# Patient Record
Sex: FEMALE | Race: OTHER | HISPANIC OR LATINO
[De-identification: names, ages, dates, MRNs, and addresses within clinical notes are randomized per-mention and may not be internally consistent; named-entity substitution may affect disease eponyms.]

---

## 2017-10-11 ENCOUNTER — RESULT REVIEW (OUTPATIENT)
Age: 37
End: 2017-10-11

## 2017-10-19 ENCOUNTER — APPOINTMENT (OUTPATIENT)
Dept: MAMMOGRAPHY | Facility: CLINIC | Age: 37
End: 2017-10-19

## 2017-10-26 ENCOUNTER — APPOINTMENT (OUTPATIENT)
Dept: ULTRASOUND IMAGING | Facility: CLINIC | Age: 37
End: 2017-10-26
Payer: COMMERCIAL

## 2017-10-26 ENCOUNTER — APPOINTMENT (OUTPATIENT)
Dept: MAMMOGRAPHY | Facility: CLINIC | Age: 37
End: 2017-10-26
Payer: COMMERCIAL

## 2017-10-26 ENCOUNTER — OUTPATIENT (OUTPATIENT)
Dept: OUTPATIENT SERVICES | Facility: HOSPITAL | Age: 37
LOS: 1 days | End: 2017-10-26
Payer: COMMERCIAL

## 2017-10-26 DIAGNOSIS — Z00.8 ENCOUNTER FOR OTHER GENERAL EXAMINATION: ICD-10-CM

## 2017-10-26 PROCEDURE — G0279: CPT | Mod: 26

## 2017-10-26 PROCEDURE — 76642 ULTRASOUND BREAST LIMITED: CPT

## 2017-10-26 PROCEDURE — G0204: CPT | Mod: 26

## 2017-10-26 PROCEDURE — 76642 ULTRASOUND BREAST LIMITED: CPT | Mod: 26,LT

## 2017-10-26 PROCEDURE — 77066 DX MAMMO INCL CAD BI: CPT

## 2017-10-26 PROCEDURE — G0279: CPT

## 2018-10-12 ENCOUNTER — RESULT REVIEW (OUTPATIENT)
Age: 38
End: 2018-10-12

## 2018-10-30 ENCOUNTER — OUTPATIENT (OUTPATIENT)
Dept: OUTPATIENT SERVICES | Facility: HOSPITAL | Age: 38
LOS: 1 days | End: 2018-10-30
Payer: COMMERCIAL

## 2018-10-30 ENCOUNTER — APPOINTMENT (OUTPATIENT)
Dept: MAMMOGRAPHY | Facility: CLINIC | Age: 38
End: 2018-10-30
Payer: COMMERCIAL

## 2018-10-30 DIAGNOSIS — Z00.8 ENCOUNTER FOR OTHER GENERAL EXAMINATION: ICD-10-CM

## 2018-10-30 PROCEDURE — 77067 SCR MAMMO BI INCL CAD: CPT

## 2018-10-30 PROCEDURE — 77063 BREAST TOMOSYNTHESIS BI: CPT

## 2018-10-30 PROCEDURE — 77067 SCR MAMMO BI INCL CAD: CPT | Mod: 26

## 2018-10-30 PROCEDURE — 77063 BREAST TOMOSYNTHESIS BI: CPT | Mod: 26

## 2018-12-23 ENCOUNTER — EMERGENCY (EMERGENCY)
Facility: HOSPITAL | Age: 38
LOS: 0 days | Discharge: HOME | End: 2018-12-23
Attending: EMERGENCY MEDICINE | Admitting: EMERGENCY MEDICINE
Payer: MEDICAID

## 2018-12-23 ENCOUNTER — TRANSCRIPTION ENCOUNTER (OUTPATIENT)
Age: 38
End: 2018-12-23

## 2018-12-23 VITALS
HEART RATE: 71 BPM | TEMPERATURE: 98 F | RESPIRATION RATE: 16 BRPM | SYSTOLIC BLOOD PRESSURE: 126 MMHG | DIASTOLIC BLOOD PRESSURE: 89 MMHG | OXYGEN SATURATION: 100 %

## 2018-12-23 VITALS — HEIGHT: 61 IN | WEIGHT: 158.07 LBS

## 2018-12-23 DIAGNOSIS — R07.9 CHEST PAIN, UNSPECIFIED: ICD-10-CM

## 2018-12-23 DIAGNOSIS — M79.602 PAIN IN LEFT ARM: ICD-10-CM

## 2018-12-23 DIAGNOSIS — M62.838 OTHER MUSCLE SPASM: ICD-10-CM

## 2018-12-23 DIAGNOSIS — R11.0 NAUSEA: ICD-10-CM

## 2018-12-23 DIAGNOSIS — Z87.59 PERSONAL HISTORY OF OTHER COMPLICATIONS OF PREGNANCY, CHILDBIRTH AND THE PUERPERIUM: ICD-10-CM

## 2018-12-23 LAB
ALBUMIN SERPL ELPH-MCNC: 4 G/DL — SIGNIFICANT CHANGE UP (ref 3.3–5)
ALP SERPL-CCNC: 79 U/L — SIGNIFICANT CHANGE UP (ref 40–120)
ALT FLD-CCNC: 29 U/L — SIGNIFICANT CHANGE UP (ref 12–78)
ANION GAP SERPL CALC-SCNC: 5 MMOL/L — SIGNIFICANT CHANGE UP (ref 5–17)
APTT BLD: 32.2 SEC — SIGNIFICANT CHANGE UP (ref 27.5–36.3)
AST SERPL-CCNC: 16 U/L — SIGNIFICANT CHANGE UP (ref 15–37)
BASOPHILS # BLD AUTO: 0.04 K/UL — SIGNIFICANT CHANGE UP (ref 0–0.2)
BASOPHILS NFR BLD AUTO: 0.6 % — SIGNIFICANT CHANGE UP (ref 0–2)
BILIRUB SERPL-MCNC: 0.4 MG/DL — SIGNIFICANT CHANGE UP (ref 0.2–1.2)
BUN SERPL-MCNC: 9 MG/DL — SIGNIFICANT CHANGE UP (ref 7–23)
CALCIUM SERPL-MCNC: 8.8 MG/DL — SIGNIFICANT CHANGE UP (ref 8.5–10.1)
CHLORIDE SERPL-SCNC: 107 MMOL/L — SIGNIFICANT CHANGE UP (ref 96–108)
CO2 SERPL-SCNC: 28 MMOL/L — SIGNIFICANT CHANGE UP (ref 22–31)
CREAT SERPL-MCNC: 0.72 MG/DL — SIGNIFICANT CHANGE UP (ref 0.5–1.3)
D DIMER BLD IA.RAPID-MCNC: <150 NG/ML DDU — SIGNIFICANT CHANGE UP
EOSINOPHIL # BLD AUTO: 0.2 K/UL — SIGNIFICANT CHANGE UP (ref 0–0.5)
EOSINOPHIL NFR BLD AUTO: 2.9 % — SIGNIFICANT CHANGE UP (ref 0–6)
GLUCOSE SERPL-MCNC: 88 MG/DL — SIGNIFICANT CHANGE UP (ref 70–99)
HCG SERPL-ACNC: <1 MIU/ML — SIGNIFICANT CHANGE UP
HCT VFR BLD CALC: 40.2 % — SIGNIFICANT CHANGE UP (ref 34.5–45)
HGB BLD-MCNC: 13.6 G/DL — SIGNIFICANT CHANGE UP (ref 11.5–15.5)
IMM GRANULOCYTES NFR BLD AUTO: 0.4 % — SIGNIFICANT CHANGE UP (ref 0–1.5)
INR BLD: 1.03 RATIO — SIGNIFICANT CHANGE UP (ref 0.88–1.16)
LYMPHOCYTES # BLD AUTO: 2.06 K/UL — SIGNIFICANT CHANGE UP (ref 1–3.3)
LYMPHOCYTES # BLD AUTO: 29.9 % — SIGNIFICANT CHANGE UP (ref 13–44)
MCHC RBC-ENTMCNC: 32.2 PG — SIGNIFICANT CHANGE UP (ref 27–34)
MCHC RBC-ENTMCNC: 33.8 GM/DL — SIGNIFICANT CHANGE UP (ref 32–36)
MCV RBC AUTO: 95 FL — SIGNIFICANT CHANGE UP (ref 80–100)
MONOCYTES # BLD AUTO: 0.52 K/UL — SIGNIFICANT CHANGE UP (ref 0–0.9)
MONOCYTES NFR BLD AUTO: 7.5 % — SIGNIFICANT CHANGE UP (ref 2–14)
NEUTROPHILS # BLD AUTO: 4.04 K/UL — SIGNIFICANT CHANGE UP (ref 1.8–7.4)
NEUTROPHILS NFR BLD AUTO: 58.7 % — SIGNIFICANT CHANGE UP (ref 43–77)
NRBC # BLD: 0 /100 WBCS — SIGNIFICANT CHANGE UP (ref 0–0)
PLATELET # BLD AUTO: 322 K/UL — SIGNIFICANT CHANGE UP (ref 150–400)
POTASSIUM SERPL-MCNC: 3.5 MMOL/L — SIGNIFICANT CHANGE UP (ref 3.5–5.3)
POTASSIUM SERPL-SCNC: 3.5 MMOL/L — SIGNIFICANT CHANGE UP (ref 3.5–5.3)
PROT SERPL-MCNC: 8 GM/DL — SIGNIFICANT CHANGE UP (ref 6–8.3)
PROTHROM AB SERPL-ACNC: 11.5 SEC — SIGNIFICANT CHANGE UP (ref 10–12.9)
RBC # BLD: 4.23 M/UL — SIGNIFICANT CHANGE UP (ref 3.8–5.2)
RBC # FLD: 12.7 % — SIGNIFICANT CHANGE UP (ref 10.3–14.5)
SODIUM SERPL-SCNC: 140 MMOL/L — SIGNIFICANT CHANGE UP (ref 135–145)
TROPONIN I SERPL-MCNC: <0.015 NG/ML — SIGNIFICANT CHANGE UP (ref 0.01–0.04)
TROPONIN I SERPL-MCNC: <0.015 NG/ML — SIGNIFICANT CHANGE UP (ref 0.01–0.04)
WBC # BLD: 6.89 K/UL — SIGNIFICANT CHANGE UP (ref 3.8–10.5)
WBC # FLD AUTO: 6.89 K/UL — SIGNIFICANT CHANGE UP (ref 3.8–10.5)

## 2018-12-23 PROCEDURE — 99283 EMERGENCY DEPT VISIT LOW MDM: CPT

## 2018-12-23 PROCEDURE — 71046 X-RAY EXAM CHEST 2 VIEWS: CPT | Mod: 26

## 2018-12-23 PROCEDURE — 93010 ELECTROCARDIOGRAM REPORT: CPT

## 2018-12-23 RX ORDER — IBUPROFEN 200 MG
600 TABLET ORAL ONCE
Qty: 0 | Refills: 0 | Status: COMPLETED | OUTPATIENT
Start: 2018-12-23 | End: 2018-12-23

## 2018-12-23 RX ORDER — ASPIRIN/CALCIUM CARB/MAGNESIUM 324 MG
325 TABLET ORAL ONCE
Qty: 0 | Refills: 0 | Status: COMPLETED | OUTPATIENT
Start: 2018-12-23 | End: 2018-12-23

## 2018-12-23 RX ORDER — METHOCARBAMOL 500 MG/1
1 TABLET, FILM COATED ORAL
Qty: 15 | Refills: 0 | OUTPATIENT
Start: 2018-12-23 | End: 2018-12-27

## 2018-12-23 RX ORDER — ACETAMINOPHEN 500 MG
975 TABLET ORAL ONCE
Qty: 0 | Refills: 0 | Status: COMPLETED | OUTPATIENT
Start: 2018-12-23 | End: 2018-12-23

## 2018-12-23 RX ORDER — IBUPROFEN 200 MG
1 TABLET ORAL
Qty: 10 | Refills: 0 | OUTPATIENT
Start: 2018-12-23 | End: 2018-12-27

## 2018-12-23 RX ADMIN — Medication 975 MILLIGRAM(S): at 17:29

## 2018-12-23 RX ADMIN — Medication 325 MILLIGRAM(S): at 16:57

## 2018-12-23 RX ADMIN — Medication 975 MILLIGRAM(S): at 16:56

## 2018-12-23 RX ADMIN — Medication 600 MILLIGRAM(S): at 21:50

## 2018-12-23 NOTE — ED STATDOCS - NS_ ATTENDINGSCRIBEDETAILS _ED_A_ED_FT
The scribe's documentation has been prepared under my direction and personally reviewed by me in its entirety.  I confirm that the note above accurately reflects all my work, treatment, procedures, and decision making except where otherwise noted or amended by me.  Aramis Woods M.D.

## 2018-12-23 NOTE — ED STATDOCS - ATTENDING CONTRIBUTION TO CARE
SHELLI Woods MD,  performed the initial face to face bedside interview with this patient regarding history of present illness, review of symptoms and relevant past medical, social and family history.  I completed an independent physical examination.  I was the initial provider who evaluated this patient. I have signed out the follow up of any pending tests (i.e. labs, radiological studies) to the ACP.  I have communicated the patient’s plan of care and disposition with the ACP.

## 2018-12-23 NOTE — ED ADULT TRIAGE NOTE - CHIEF COMPLAINT QUOTE
pt presents to ED from  c/o chest pain since this morning. pt went to  to be evaluated and was instructed to f/u @ ED. + back pain.

## 2018-12-23 NOTE — ED STATDOCS - MEDICAL DECISION MAKING DETAILS
37 y/o F with PMHx of Ectopic Pregnancy presenting to the ED c/o CP radiating into LUE since this morning. Plan: r/o PE, r/o ACS. Likely MSK. Likely d/c with outpatient follow up. 39 y/o F with PMHx of Ectopic Pregnancy, Breast Biopsy presenting to the ED c/o CP radiating into LUE since this morning. Plan: r/o PE, r/o ACS. Likely MSK. Likely d/c with outpatient follow up. 37 y/o F with PMHx of Ectopic Pregnancy, Breast Biopsy presenting to the ED c/o CP radiating into LUE since this morning. Plan: r/o PE, r/o ACS. Likely MSK. EKG serial trop.  Likely d/c with outpatient follow up.

## 2018-12-23 NOTE — ED STATDOCS - NSFOLLOWUPINSTRUCTIONS_ED_ALL_ED_FT
follow up with pmd and orthopedic  take NSAIDs as prescribed   return to ed for any worsening of symptoms.

## 2018-12-23 NOTE — ED STATDOCS - PROGRESS NOTE DETAILS
37 y/o F with PMHx of Ectopic Pregnancy, Breast Biopsy presenting to the ED c/o CP radiating into LUE since this morning. Pt speaks Yakut and HPI obtained via [facility] , #[id number]. CP exacerbated by moving upper extremities, not exacerbated with ambulation. +sore throat. +nausea. Seen at Urgent Care, advised to come into ED for evaluation. Denies any SOB, abd pain, vomiting, LE pain, LE swelling, diarrhea. fevers, or chills. No recent travel, trauma, or surgeries. No hx of blood clots. No significant cardiac FHx. No alcohol, tobacco, or recreational drug use. NKDA. PE Cardiac: s1-s2 rrr, tenderness to left anterior chest wall , no crepitus/ecchymosis/bruising. plan: labsc, cxr, pain meds and reeval. -Celine Ramírez PA-C pt aware of lab results and feels better with meds given advised to fu with pmd and ortho and to return to ed if needed, pt agrees with plan and well appearing on dc. -Celine Ramírez PA-C EKG reveals no evidence of ACS.  Trop x2 negative.  No fever or EKG findings suggestive of pericarditis, or myocarditis.  No Allen's Triad or signs of pericardial tamponade.  No clinical findings suggestive of  pulmonary embolism. D Dimer negative. CXR normal with clear lungs, normal mediastinum, and bilateral breath sounds.  No findings suggestive of pneumothorax, pneumonia, or esophageal perforation.  Historically pain not abrupt in onset, not tearing or ripping, pulses are symmetric, no murmur noted, no clinical findings suggestive of aortic dissection.  D dimer negative.  Normal mediastinum.

## 2018-12-23 NOTE — ED STATDOCS - OBJECTIVE STATEMENT
39 y/o F with PMHx of Ectopic Pregnancy presenting to the ED c/o CP radiating into LUE since this morning. Pt speaks Citizen of [place] and HPI obtained via [employer] , #[id number]. CP exacerbated by moving upper extremities, not exacerbated with ambulation. +sore throat. +nausea. Seen at Urgent Care, advised to come into ED for evaluation. Denies any SOB, abd pain, vomiting, LE pain, LE swelling, diarrhea. fevers, or chills. No recent travel, trauma, or surgeries. No hx of blood clots. No significant cardiac FHx. No alcohol, tobacco, or recreational drug use. NKDA. 37 y/o F with PMHx of Ectopic Pregnancy, Breast Biopsy presenting to the ED c/o CP radiating into LUE since this morning. Pt speaks Persian and HPI obtained via [employer] , #[id number]. CP exacerbated by moving upper extremities, not exacerbated with ambulation. +sore throat. +nausea. Seen at Urgent Care, advised to come into ED for evaluation. Denies any SOB, abd pain, vomiting, LE pain, LE swelling, diarrhea. fevers, or chills. No recent travel, trauma, or surgeries. No hx of blood clots. No significant cardiac FHx. No alcohol, tobacco, or recreational drug use. NKDA.

## 2018-12-23 NOTE — ED ADULT NURSE REASSESSMENT NOTE - NS ED NURSE REASSESS COMMENT FT1
Patient updated to plan of care. Patient requesting PO fluids. Given per Celine ZHANG. No distress noted. Results given to patient. Pending discharge.

## 2018-12-23 NOTE — ED ADULT NURSE NOTE - OBJECTIVE STATEMENT
c/o chest pain since Thursday radiating to neck, left arm. Denies med hx. Denies fever, reports SOB due to pain.

## 2018-12-23 NOTE — ED STATDOCS - PHYSICAL EXAMINATION
Pulses equal in all four extremities. No carotid bruit.  No focal neurological deficit.  No unilateral pallor or vascular compromise.  No heart murmur.

## 2018-12-23 NOTE — ED STATDOCS - CARE PROVIDER_API CALL
Genaro Doshi (DO), Orthopaedic Surgery  [address] [place] [address]  Phone: [phone]  Fax: [phone]

## 2019-04-11 PROBLEM — O00.90 UNSPECIFIED ECTOPIC PREGNANCY WITHOUT INTRAUTERINE PREGNANCY: Chronic | Status: ACTIVE | Noted: 2019-01-01

## 2019-04-26 ENCOUNTER — OUTPATIENT (OUTPATIENT)
Dept: OUTPATIENT SERVICES | Facility: HOSPITAL | Age: 39
LOS: 1 days | End: 2019-04-26
Payer: COMMERCIAL

## 2019-04-26 ENCOUNTER — APPOINTMENT (OUTPATIENT)
Dept: ULTRASOUND IMAGING | Facility: CLINIC | Age: 39
End: 2019-04-26
Payer: COMMERCIAL

## 2019-04-26 DIAGNOSIS — Z00.8 ENCOUNTER FOR OTHER GENERAL EXAMINATION: ICD-10-CM

## 2019-04-26 PROCEDURE — 76641 ULTRASOUND BREAST COMPLETE: CPT | Mod: 26,RT

## 2019-04-26 PROCEDURE — 76641 ULTRASOUND BREAST COMPLETE: CPT

## 2019-10-25 ENCOUNTER — RESULT REVIEW (OUTPATIENT)
Age: 39
End: 2019-10-25

## 2019-10-29 ENCOUNTER — APPOINTMENT (OUTPATIENT)
Dept: MAMMOGRAPHY | Facility: CLINIC | Age: 39
End: 2019-10-29

## 2019-10-31 ENCOUNTER — APPOINTMENT (OUTPATIENT)
Dept: MAMMOGRAPHY | Facility: CLINIC | Age: 39
End: 2019-10-31
Payer: COMMERCIAL

## 2019-10-31 ENCOUNTER — OUTPATIENT (OUTPATIENT)
Dept: OUTPATIENT SERVICES | Facility: HOSPITAL | Age: 39
LOS: 1 days | End: 2019-10-31
Payer: COMMERCIAL

## 2019-10-31 DIAGNOSIS — Z00.8 ENCOUNTER FOR OTHER GENERAL EXAMINATION: ICD-10-CM

## 2019-10-31 PROCEDURE — 77063 BREAST TOMOSYNTHESIS BI: CPT

## 2019-10-31 PROCEDURE — 77067 SCR MAMMO BI INCL CAD: CPT | Mod: 26

## 2019-10-31 PROCEDURE — 77067 SCR MAMMO BI INCL CAD: CPT

## 2019-10-31 PROCEDURE — 77063 BREAST TOMOSYNTHESIS BI: CPT | Mod: 26

## 2019-11-25 NOTE — ED ADULT NURSE NOTE - PAIN RATING/NUMBER SCALE (0-10): ACTIVITY
Pt here for inhaler instruction; Wife and son are present  We began discussing inhaler technique and it was decided that pt will be better with an aerochamber; Instructions given to wife and pt;   Rx sent to pharmacy and she will call if there are any pro 3

## 2019-12-02 ENCOUNTER — RESULT REVIEW (OUTPATIENT)
Age: 39
End: 2019-12-02

## 2020-04-09 ENCOUNTER — EMERGENCY (EMERGENCY)
Facility: HOSPITAL | Age: 40
LOS: 0 days | Discharge: HOME | End: 2020-04-09
Payer: MEDICARE

## 2020-04-09 VITALS
RESPIRATION RATE: 18 BRPM | DIASTOLIC BLOOD PRESSURE: 77 MMHG | SYSTOLIC BLOOD PRESSURE: 125 MMHG | TEMPERATURE: 99 F | OXYGEN SATURATION: 100 % | HEART RATE: 73 BPM

## 2020-04-09 DIAGNOSIS — R50.9 FEVER, UNSPECIFIED: ICD-10-CM

## 2020-04-09 DIAGNOSIS — R19.7 DIARRHEA, UNSPECIFIED: ICD-10-CM

## 2020-04-09 DIAGNOSIS — Z20.828 CONTACT WITH AND (SUSPECTED) EXPOSURE TO OTHER VIRAL COMMUNICABLE DISEASES: ICD-10-CM

## 2020-04-09 DIAGNOSIS — R05 COUGH: ICD-10-CM

## 2020-04-09 DIAGNOSIS — B34.9 VIRAL INFECTION, UNSPECIFIED: ICD-10-CM

## 2020-04-09 PROCEDURE — 99283 EMERGENCY DEPT VISIT LOW MDM: CPT

## 2020-04-09 PROCEDURE — 87635 SARS-COV-2 COVID-19 AMP PRB: CPT

## 2020-04-09 NOTE — ED STATDOCS - OBJECTIVE STATEMENT
Pt with no PMH presents to ED with subjective fever, cough, diarrhea intermittently, body aches, sore throat x 14 days. Pt recently exposed to COVID-19. Pt here for testing.

## 2020-04-09 NOTE — ED STATDOCS - PATIENT PORTAL LINK FT
You can access the FollowMyHealth Patient Portal offered by [facility] by registering at the following website: [email or web address]. By joining [facility]’s FollowMyHealth portal, you will also be able to view your health information using other applications (apps) compatible with our system.

## 2020-04-09 NOTE — ED ADULT NURSE NOTE - OBJECTIVE STATEMENT
PATIENT WAS TREATED, EVALUATED AND DISCHARGED BY INTAKE PROVIDER. PLEASE SEE PROVIDER NOTE FOR ASSESSMENT.  DISCHARGE INSTRUCTIONS REVIEWED WITH PATIENT VERBALLY, PT VERBALIZED UNDERSTANDING OF DISCHARGE INSTRUCTIONS. PAPER COPY OF DISCHARGE INSTRUCTIONS GIVEN TO PATIENT WITH SELF SIDRA AND COVID 19 INFORMATION.  Patient has given verbal consent to call/text them with results.

## 2020-04-10 LAB — SARS-COV-2 RNA SPEC QL NAA+PROBE: SIGNIFICANT CHANGE UP

## 2020-07-14 ENCOUNTER — TRANSCRIPTION ENCOUNTER (OUTPATIENT)
Age: 40
End: 2020-07-14

## 2020-08-05 ENCOUNTER — RESULT REVIEW (OUTPATIENT)
Age: 40
End: 2020-08-05

## 2020-08-05 ENCOUNTER — APPOINTMENT (OUTPATIENT)
Dept: MRI IMAGING | Facility: CLINIC | Age: 40
End: 2020-08-05
Payer: COMMERCIAL

## 2020-08-05 ENCOUNTER — OUTPATIENT (OUTPATIENT)
Dept: OUTPATIENT SERVICES | Facility: HOSPITAL | Age: 40
LOS: 1 days | End: 2020-08-05
Payer: COMMERCIAL

## 2020-08-05 DIAGNOSIS — Z00.8 ENCOUNTER FOR OTHER GENERAL EXAMINATION: ICD-10-CM

## 2020-08-05 PROCEDURE — 72148 MRI LUMBAR SPINE W/O DYE: CPT

## 2020-08-05 PROCEDURE — 72148 MRI LUMBAR SPINE W/O DYE: CPT | Mod: 26

## 2020-09-09 ENCOUNTER — TRANSCRIPTION ENCOUNTER (OUTPATIENT)
Age: 40
End: 2020-09-09

## 2020-11-18 ENCOUNTER — RESULT REVIEW (OUTPATIENT)
Age: 40
End: 2020-11-18

## 2020-12-01 ENCOUNTER — APPOINTMENT (OUTPATIENT)
Dept: MAMMOGRAPHY | Facility: CLINIC | Age: 40
End: 2020-12-01
Payer: MEDICAID

## 2020-12-01 ENCOUNTER — RESULT REVIEW (OUTPATIENT)
Age: 40
End: 2020-12-01

## 2020-12-01 ENCOUNTER — OUTPATIENT (OUTPATIENT)
Dept: OUTPATIENT SERVICES | Facility: HOSPITAL | Age: 40
LOS: 1 days | End: 2020-12-01
Payer: MEDICAID

## 2020-12-01 DIAGNOSIS — Z00.8 ENCOUNTER FOR OTHER GENERAL EXAMINATION: ICD-10-CM

## 2020-12-01 PROCEDURE — 77063 BREAST TOMOSYNTHESIS BI: CPT | Mod: 26

## 2020-12-01 PROCEDURE — 77063 BREAST TOMOSYNTHESIS BI: CPT

## 2020-12-01 PROCEDURE — 77067 SCR MAMMO BI INCL CAD: CPT

## 2020-12-01 PROCEDURE — 77067 SCR MAMMO BI INCL CAD: CPT | Mod: 26

## 2020-12-13 ENCOUNTER — RESULT REVIEW (OUTPATIENT)
Age: 40
End: 2020-12-13

## 2020-12-13 ENCOUNTER — APPOINTMENT (OUTPATIENT)
Dept: ULTRASOUND IMAGING | Facility: CLINIC | Age: 40
End: 2020-12-13
Payer: MEDICAID

## 2020-12-13 ENCOUNTER — OUTPATIENT (OUTPATIENT)
Dept: OUTPATIENT SERVICES | Facility: HOSPITAL | Age: 40
LOS: 1 days | End: 2020-12-13
Payer: MEDICAID

## 2020-12-13 DIAGNOSIS — E04.1 NONTOXIC SINGLE THYROID NODULE: ICD-10-CM

## 2020-12-13 PROCEDURE — 76536 US EXAM OF HEAD AND NECK: CPT | Mod: 26

## 2020-12-13 PROCEDURE — 76536 US EXAM OF HEAD AND NECK: CPT

## 2021-01-16 ENCOUNTER — OUTPATIENT (OUTPATIENT)
Dept: OUTPATIENT SERVICES | Facility: HOSPITAL | Age: 41
LOS: 1 days | End: 2021-01-16
Payer: MEDICAID

## 2021-01-16 DIAGNOSIS — Z20.828 CONTACT WITH AND (SUSPECTED) EXPOSURE TO OTHER VIRAL COMMUNICABLE DISEASES: ICD-10-CM

## 2021-01-16 PROCEDURE — C9803: CPT

## 2021-01-16 PROCEDURE — U0005: CPT

## 2021-01-16 PROCEDURE — U0003: CPT

## 2021-01-17 DIAGNOSIS — Z20.828 CONTACT WITH AND (SUSPECTED) EXPOSURE TO OTHER VIRAL COMMUNICABLE DISEASES: ICD-10-CM

## 2021-01-17 LAB — SARS-COV-2 RNA SPEC QL NAA+PROBE: SIGNIFICANT CHANGE UP

## 2021-03-22 ENCOUNTER — TRANSCRIPTION ENCOUNTER (OUTPATIENT)
Age: 41
End: 2021-03-22

## 2021-04-25 ENCOUNTER — TRANSCRIPTION ENCOUNTER (OUTPATIENT)
Age: 41
End: 2021-04-25

## 2021-07-18 NOTE — ED STATDOCS - INTERPRETATION
(normal spontaneous vaginal delivery)   FT 6#7   FT 9#  Status post total shoulder arthroplasty, left  2015   normal sinus rhythm

## 2021-11-05 ENCOUNTER — RESULT REVIEW (OUTPATIENT)
Age: 41
End: 2021-11-05

## 2021-12-11 ENCOUNTER — APPOINTMENT (OUTPATIENT)
Dept: MAMMOGRAPHY | Facility: CLINIC | Age: 41
End: 2021-12-11
Payer: MEDICAID

## 2021-12-11 ENCOUNTER — OUTPATIENT (OUTPATIENT)
Dept: OUTPATIENT SERVICES | Facility: HOSPITAL | Age: 41
LOS: 1 days | End: 2021-12-11
Payer: MEDICAID

## 2021-12-11 DIAGNOSIS — Z00.8 ENCOUNTER FOR OTHER GENERAL EXAMINATION: ICD-10-CM

## 2021-12-11 PROCEDURE — 77063 BREAST TOMOSYNTHESIS BI: CPT

## 2021-12-11 PROCEDURE — 77063 BREAST TOMOSYNTHESIS BI: CPT | Mod: 26

## 2021-12-11 PROCEDURE — 77067 SCR MAMMO BI INCL CAD: CPT | Mod: 26

## 2021-12-11 PROCEDURE — 77067 SCR MAMMO BI INCL CAD: CPT

## 2023-02-18 ENCOUNTER — TRANSCRIPTION ENCOUNTER (OUTPATIENT)
Age: 43
End: 2023-02-18

## 2023-02-18 ENCOUNTER — APPOINTMENT (OUTPATIENT)
Dept: MAMMOGRAPHY | Facility: CLINIC | Age: 43
End: 2023-02-18
Payer: MEDICAID

## 2023-02-18 ENCOUNTER — OUTPATIENT (OUTPATIENT)
Dept: OUTPATIENT SERVICES | Facility: HOSPITAL | Age: 43
LOS: 1 days | End: 2023-02-18
Payer: MEDICAID

## 2023-02-18 DIAGNOSIS — Z01.419 ENCOUNTER FOR GYNECOLOGICAL EXAMINATION (GENERAL) (ROUTINE) WITHOUT ABNORMAL FINDINGS: ICD-10-CM

## 2023-02-18 DIAGNOSIS — Z00.8 ENCOUNTER FOR OTHER GENERAL EXAMINATION: ICD-10-CM

## 2023-02-18 PROCEDURE — 77067 SCR MAMMO BI INCL CAD: CPT | Mod: 26

## 2023-02-18 PROCEDURE — 77067 SCR MAMMO BI INCL CAD: CPT

## 2023-02-18 PROCEDURE — 77063 BREAST TOMOSYNTHESIS BI: CPT

## 2023-02-18 PROCEDURE — 77063 BREAST TOMOSYNTHESIS BI: CPT | Mod: 26

## 2023-05-11 ENCOUNTER — OUTPATIENT (OUTPATIENT)
Dept: OUTPATIENT SERVICES | Facility: HOSPITAL | Age: 43
LOS: 1 days | End: 2023-05-11
Payer: MEDICAID

## 2023-05-11 ENCOUNTER — APPOINTMENT (OUTPATIENT)
Dept: MRI IMAGING | Facility: CLINIC | Age: 43
End: 2023-05-11
Payer: MEDICAID

## 2023-05-11 DIAGNOSIS — Z00.8 ENCOUNTER FOR OTHER GENERAL EXAMINATION: ICD-10-CM

## 2023-05-11 DIAGNOSIS — R92.8 OTHER ABNORMAL AND INCONCLUSIVE FINDINGS ON DIAGNOSTIC IMAGING OF BREAST: ICD-10-CM

## 2023-05-11 PROCEDURE — 77049 MRI BREAST C-+ W/CAD BI: CPT | Mod: 26

## 2023-05-11 PROCEDURE — A9585: CPT

## 2023-05-11 PROCEDURE — C8937: CPT

## 2023-05-11 PROCEDURE — C8908: CPT

## 2023-08-03 ENCOUNTER — APPOINTMENT (OUTPATIENT)
Dept: DERMATOLOGY | Facility: CLINIC | Age: 43
End: 2023-08-03
Payer: MEDICAID

## 2023-08-03 VITALS — HEIGHT: 65 IN | WEIGHT: 145 LBS | BODY MASS INDEX: 24.16 KG/M2

## 2023-08-03 DIAGNOSIS — L82.1 OTHER SEBORRHEIC KERATOSIS: ICD-10-CM

## 2023-08-03 PROCEDURE — 99203 OFFICE O/P NEW LOW 30 MIN: CPT

## 2023-08-03 NOTE — HISTORY OF PRESENT ILLNESS
[FreeTextEntry1] : NPV spot [de-identified] : DEVI SETHI  is a pleasant 42 year old F, who presented for the following:  - Bump on the left forehead x 1 year, itchy and growing. No treatments tried - Also with another brown bump on the L NLF

## 2023-08-03 NOTE — ASSESSMENT
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 I have discussed the nature and usual chronic course with the patient. Reassured  RTC prn

## 2023-08-08 ENCOUNTER — EMERGENCY (EMERGENCY)
Facility: HOSPITAL | Age: 43
LOS: 0 days | Discharge: HOME | End: 2023-08-08
Attending: HOSPITALIST
Payer: MEDICAID

## 2023-08-08 VITALS
HEART RATE: 74 BPM | TEMPERATURE: 98 F | OXYGEN SATURATION: 98 % | RESPIRATION RATE: 17 BRPM | DIASTOLIC BLOOD PRESSURE: 72 MMHG | SYSTOLIC BLOOD PRESSURE: 113 MMHG

## 2023-08-08 VITALS — HEIGHT: 64 IN | WEIGHT: 145.06 LBS

## 2023-08-08 DIAGNOSIS — B97.89 OTHER VIRAL AGENTS AS THE CAUSE OF DISEASES CLASSIFIED ELSEWHERE: ICD-10-CM

## 2023-08-08 DIAGNOSIS — J06.9 ACUTE UPPER RESPIRATORY INFECTION, UNSPECIFIED: ICD-10-CM

## 2023-08-08 DIAGNOSIS — R42 DIZZINESS AND GIDDINESS: ICD-10-CM

## 2023-08-08 DIAGNOSIS — H11.31 CONJUNCTIVAL HEMORRHAGE, RIGHT EYE: ICD-10-CM

## 2023-08-08 DIAGNOSIS — R73.03 PREDIABETES: ICD-10-CM

## 2023-08-08 LAB
ALBUMIN SERPL ELPH-MCNC: 3.5 G/DL — SIGNIFICANT CHANGE UP (ref 3.3–5)
ALP SERPL-CCNC: 74 U/L — SIGNIFICANT CHANGE UP (ref 40–120)
ALT FLD-CCNC: 21 U/L — SIGNIFICANT CHANGE UP (ref 12–78)
ANION GAP SERPL CALC-SCNC: 4 MMOL/L — LOW (ref 5–17)
AST SERPL-CCNC: 18 U/L — SIGNIFICANT CHANGE UP (ref 15–37)
BASOPHILS # BLD AUTO: 0.04 K/UL — SIGNIFICANT CHANGE UP (ref 0–0.2)
BASOPHILS NFR BLD AUTO: 0.6 % — SIGNIFICANT CHANGE UP (ref 0–2)
BILIRUB SERPL-MCNC: 0.3 MG/DL — SIGNIFICANT CHANGE UP (ref 0.2–1.2)
BUN SERPL-MCNC: 14 MG/DL — SIGNIFICANT CHANGE UP (ref 7–23)
CALCIUM SERPL-MCNC: 8.7 MG/DL — SIGNIFICANT CHANGE UP (ref 8.5–10.1)
CHLORIDE SERPL-SCNC: 108 MMOL/L — SIGNIFICANT CHANGE UP (ref 96–108)
CO2 SERPL-SCNC: 29 MMOL/L — SIGNIFICANT CHANGE UP (ref 22–31)
CREAT SERPL-MCNC: 0.81 MG/DL — SIGNIFICANT CHANGE UP (ref 0.5–1.3)
EGFR: 91 ML/MIN/1.73M2 — SIGNIFICANT CHANGE UP
EOSINOPHIL # BLD AUTO: 0.21 K/UL — SIGNIFICANT CHANGE UP (ref 0–0.5)
EOSINOPHIL NFR BLD AUTO: 3.4 % — SIGNIFICANT CHANGE UP (ref 0–6)
GLUCOSE SERPL-MCNC: 98 MG/DL — SIGNIFICANT CHANGE UP (ref 70–99)
HCT VFR BLD CALC: 36.4 % — SIGNIFICANT CHANGE UP (ref 34.5–45)
HGB BLD-MCNC: 12.9 G/DL — SIGNIFICANT CHANGE UP (ref 11.5–15.5)
IMM GRANULOCYTES NFR BLD AUTO: 0.3 % — SIGNIFICANT CHANGE UP (ref 0–0.9)
LYMPHOCYTES # BLD AUTO: 2.21 K/UL — SIGNIFICANT CHANGE UP (ref 1–3.3)
LYMPHOCYTES # BLD AUTO: 35.6 % — SIGNIFICANT CHANGE UP (ref 13–44)
MCHC RBC-ENTMCNC: 34.2 PG — HIGH (ref 27–34)
MCHC RBC-ENTMCNC: 35.4 GM/DL — SIGNIFICANT CHANGE UP (ref 32–36)
MCV RBC AUTO: 96.6 FL — SIGNIFICANT CHANGE UP (ref 80–100)
MONOCYTES # BLD AUTO: 0.43 K/UL — SIGNIFICANT CHANGE UP (ref 0–0.9)
MONOCYTES NFR BLD AUTO: 6.9 % — SIGNIFICANT CHANGE UP (ref 2–14)
NEUTROPHILS # BLD AUTO: 3.3 K/UL — SIGNIFICANT CHANGE UP (ref 1.8–7.4)
NEUTROPHILS NFR BLD AUTO: 53.2 % — SIGNIFICANT CHANGE UP (ref 43–77)
PLATELET # BLD AUTO: 304 K/UL — SIGNIFICANT CHANGE UP (ref 150–400)
POTASSIUM SERPL-MCNC: 3.6 MMOL/L — SIGNIFICANT CHANGE UP (ref 3.5–5.3)
POTASSIUM SERPL-SCNC: 3.6 MMOL/L — SIGNIFICANT CHANGE UP (ref 3.5–5.3)
PROT SERPL-MCNC: 7.1 GM/DL — SIGNIFICANT CHANGE UP (ref 6–8.3)
RBC # BLD: 3.77 M/UL — LOW (ref 3.8–5.2)
RBC # FLD: 13.2 % — SIGNIFICANT CHANGE UP (ref 10.3–14.5)
SODIUM SERPL-SCNC: 141 MMOL/L — SIGNIFICANT CHANGE UP (ref 135–145)
TSH SERPL-MCNC: 2.25 UU/ML — SIGNIFICANT CHANGE UP (ref 0.34–4.82)
WBC # BLD: 6.21 K/UL — SIGNIFICANT CHANGE UP (ref 3.8–10.5)
WBC # FLD AUTO: 6.21 K/UL — SIGNIFICANT CHANGE UP (ref 3.8–10.5)

## 2023-08-08 PROCEDURE — 84443 ASSAY THYROID STIM HORMONE: CPT

## 2023-08-08 PROCEDURE — 99284 EMERGENCY DEPT VISIT MOD MDM: CPT

## 2023-08-08 PROCEDURE — 82962 GLUCOSE BLOOD TEST: CPT

## 2023-08-08 PROCEDURE — 93005 ELECTROCARDIOGRAM TRACING: CPT

## 2023-08-08 PROCEDURE — 93010 ELECTROCARDIOGRAM REPORT: CPT

## 2023-08-08 PROCEDURE — 36415 COLL VENOUS BLD VENIPUNCTURE: CPT

## 2023-08-08 PROCEDURE — 80053 COMPREHEN METABOLIC PANEL: CPT

## 2023-08-08 PROCEDURE — 99283 EMERGENCY DEPT VISIT LOW MDM: CPT

## 2023-08-08 PROCEDURE — 85025 COMPLETE CBC W/AUTO DIFF WBC: CPT

## 2023-08-08 RX ORDER — SODIUM CHLORIDE 9 MG/ML
1000 INJECTION INTRAMUSCULAR; INTRAVENOUS; SUBCUTANEOUS ONCE
Refills: 0 | Status: COMPLETED | OUTPATIENT
Start: 2023-08-08 | End: 2023-08-08

## 2023-08-08 RX ORDER — MECLIZINE HCL 12.5 MG
1 TABLET ORAL
Qty: 15 | Refills: 0
Start: 2023-08-08 | End: 2023-08-12

## 2023-08-08 RX ADMIN — SODIUM CHLORIDE 1000 MILLILITER(S): 9 INJECTION INTRAMUSCULAR; INTRAVENOUS; SUBCUTANEOUS at 20:48

## 2023-08-08 NOTE — ED ADULT TRIAGE NOTE - CHIEF COMPLAINT QUOTE
ambulatory to triage, complains of dizziness, right eye redness/discomfort, pain to right side of face since 11am today. denies facial numbness. no facial asymmetry.

## 2023-08-08 NOTE — ED STATDOCS - NS ED ATTENDING STATEMENT MOD
This was a shared visit with the LEFTY. I reviewed and verified the documentation and independently performed the documented:

## 2023-08-08 NOTE — ED STATDOCS - PHYSICAL EXAMINATION
Constitutional: NAD AAOx3  Eyes: +subconjunctival hemorrhage to medial aspect of her R eye  Head: Normocephalic atraumatic  Mouth: MMM  Cardiac: regular rate   Resp: Lungs CTAB  GI: Abd s/nt/nd  Neuro: CN2-12 intact  Skin: No visible rashes

## 2023-08-08 NOTE — ED STATDOCS - NS_ ATTENDINGSCRIBEDETAILS _ED_A_ED_FT
Kaya Moulton MD: The history, relevant review of systems, past medical and surgical history, medical decision making, and physical examination was documented by the scribe in my presence and I attest to the accuracy of the documentation.

## 2023-08-08 NOTE — ED STATDOCS - OBJECTIVE STATEMENT
44 y/o female w/PMHx of preDM and thyroid cyst that she is following up with PMD presents to ED c/o sensation of dizziness while working at approx. 1100am this morning. Later int he afternoon, patient noticed right eye appeared red. Denies any pain or visual changes. No similar symptoms in the past. Reports mild ear discomfort and throat pain when swallowing. No fevers or neck stiffness. Notes 15lbs weight loss over the past few months and increased hair loss.

## 2023-08-08 NOTE — ED STATDOCS - NSFOLLOWUPINSTRUCTIONS_ED_ALL_ED_FT
Mareos  Dizziness  Los mareos son un problema muy frecuente. Se trata de yoanna sensación de inestabilidad o desvanecimiento. Puede sentir que se va a desmayar. Los mareos pueden provocarle yoanna lesión si se tropieza o se . Las personas de todas las edades pueden sufrir mareos, jesus es más frecuente en los adultos mayores. Esta afección puede tener muchas causas, entre las que se pueden mencionar los medicamentos, la deshidratación y las enfermedades.    Siga estas instrucciones en rhoades casa:  Comida y bebida    A comparison of three sample cups showing dark yellow, yellow, and pale yellow urine.  Mireya suficiente líquido sarina para mantener la orina de color amarillo pálido. Vader brant la deshidratación. Trate de beber más líquidos transparentes, sarina agua.  No mireya alcohol.  Limite el consumo de cafeína si el médico se lo indica. Verifique los ingredientes y la información nutricional para saber si un alimento o yoanna bebida contienen cafeína.  Limite el consumo de sal (sodio) si el médico se lo indica. Verifique los ingredientes y la información nutricional para saber si un alimento o yoanna bebida contienen sodio.  Actividad    A sign showing that a person should not drive.  Evite los movimientos rápidos.  Levántese de las janice con lentitud y apóyese hasta sentirse jose.  Por la mañana, siéntese kane a un lado de la cama. Cuando se sienta jose, póngase lentamente de pie mientras se sostiene de algo, hasta que sepa que ha logrado el equilibrio.  Mueva las piernas con frecuencia si debe estar de pie en un lugar jerrod mucho tiempo. Mientras esté de pie, contraiga y relaje los músculos de las piernas.  No conduzca vehículos ni opere maquinaria si se siente mareado.  Evite agacharse si se siente mareado. En rhoades casa, coloque los objetos de modo que le resulte fácil alcanzarlos sin agacharse.  Estilo de rachelle    No consuma ningún producto que contenga nicotina o tabaco. Estos productos incluyen cigarrillos, tabaco para mascar y aparatos de vapeo, sarina los cigarrillos electrónicos. Si necesita ayuda para dejar de fumar, consulte al médico.  Trate de reducir el nivel de estrés con métodos sarina el yoga o la meditación. Hable con el médico si necesita ayuda para controlar el nivel de estrés.  Instrucciones generales    Controle antelmo mareos para alfa si hay cambios.  Use los medicamentos de venta mallory y los recetados solamente sarina se lo haya indicado el médico. Hable con el médico si yessenia que los medicamentos que está tomando son la causa de antelmo mareos.  Infórmele a un amigo o a un familiar si se siente mareado. Pídale a esta persona que llame al médico si observa cambios en rhoades comportamiento.  Concurra a todas las visitas de seguimiento. Vader es importante.  Comuníquese con un médico si:  Los mareos no desaparecen o tiene síntomas nuevos.  Los mareos o la sensación de desvanecimiento empeoran.  Siente náuseas.  Se le redujo la audición.  Tiene fiebre.  Dolor o rigidez en el jenny.  Los mareos derivan en yoanna lesión o yoanna caída.  Solicite ayuda de inmediato si:  Vomita o tiene diarrea y no puede comer ni beber nada.  Tiene dificultad para hablar, caminar, tragar o usar los brazos, las jacquelyn o las piernas.  Se siente constantemente débil.  Tiene cualquier tipo de sangrado.  No piensa con claridad o tiene dificultad para armar oraciones. Es posible que un amigo o un familiar adviertan que esto ocurre.  Tiene dolor de pecho, dolor abdominal, sudoración o le falta el aire.  Tiene cambios en la visión o le aparece un dolor de hillary intenso.  Estos síntomas pueden representar un problema grave que constituye yoanna emergencia. No espere a alfa si los síntomas desaparecen. Solicite atención médica de inmediato. Comuníquese con el servicio de emergencias de rhoades localidad (911 en los [place]). No conduzca por antelmo propios medios hasta el hospital.    Resumen  Los mareos son yoanna sensación de inestabilidad o desvanecimiento. Esta afección puede tener muchas causas, entre las que se pueden mencionar los medicamentos, la deshidratación y las enfermedades.  Las personas de todas las edades pueden sufrir mareos, jesus es más frecuente en los adultos mayores.  Mireya suficiente líquido sarina para mantener la orina de color amarillo pálido. No mireya alcohol.  Evite los movimientos rápidos si se siente mareado. Controle antelmo mareos para alfa si hay cambios.

## 2023-08-08 NOTE — ED STATDOCS - PATIENT PORTAL LINK FT
You can access the FollowMyHealth Patient Portal offered by [facility] by registering at the following website: [email or web address]. By joining [facility]’s FollowMyHealth portal, you will also be able to view your health information using other applications (apps) compatible with our system.

## 2023-08-08 NOTE — ED STATDOCS - CLINICAL SUMMARY MEDICAL DECISION MAKING FREE TEXT BOX
42 y/o F with short episode of dizziness, likely peripheral vertigo associated with development URI. however hx of thyroid nodule, will obtain labs with TSH, finger stick, and EKG to further evaluate.

## 2023-08-08 NOTE — ED STATDOCS - PROGRESS NOTE DETAILS
42 yo female with a PMH of pre-DM presents with dizziness that started at approx 11am today. Pt states that she felt unsteady, lasted for 1 sec, and the symptoms resolved spontaneously. Pt also noticed that the R eye was red but denies pain or FB sensation. Pt also noticed throat pain and R ear pain.  Pt was having increased weight loss and hair loss and is due to f/u with her doctor for a thyroid cyst that she is having repeat imaging and biopsy as outpt.   Will check labs, EKG and reeval. Pt is currently asymptomatic. -Mick Marcelino PA-C Pt continues to be asymptomatic. Discussed results with pt. Informed her that eh subconjunctival hemorrhage will resolve spontaneously but will need to f/u with her pmd for he dizziness spell she had today. Will send rx for meclizine in case there is a vertiginous component and it happens again. Pt aware and agrees with plan. -Mick Marcelino PA-C

## 2023-08-08 NOTE — ED STATDOCS - NSICDXNOFAMILYHX_GEN_ALL_ED
<-- Click to add NO pertinent Family History
range of motion is not limited and there is no muscle tenderness.

## 2023-08-08 NOTE — ED ADULT NURSE NOTE - OBJECTIVE STATEMENT
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  patient states her dizziness if resolved, but overall not feeling herself.  patient denies head injury.

## 2023-08-08 NOTE — ED ADULT NURSE NOTE - NSFALLUNIVINTERV_ED_ALL_ED
Bed/Stretcher in lowest position, wheels locked, appropriate side rails in place/Call bell, personal items and telephone in reach/Instruct patient to call for assistance before getting out of bed/chair/stretcher/Non-slip footwear applied when patient is off stretcher/[place] to call system/Physically safe environment - no spills, clutter or unnecessary equipment/Purposeful proactive rounding/Room/bathroom lighting operational, light cord in reach

## 2023-11-29 ENCOUNTER — APPOINTMENT (OUTPATIENT)
Dept: MRI IMAGING | Facility: CLINIC | Age: 43
End: 2023-11-29

## 2023-12-14 ENCOUNTER — RESULT REVIEW (OUTPATIENT)
Age: 43
End: 2023-12-14

## 2023-12-14 ENCOUNTER — OUTPATIENT (OUTPATIENT)
Dept: OUTPATIENT SERVICES | Facility: HOSPITAL | Age: 43
LOS: 1 days | End: 2023-12-14
Payer: MEDICAID

## 2023-12-14 ENCOUNTER — APPOINTMENT (OUTPATIENT)
Dept: MRI IMAGING | Facility: CLINIC | Age: 43
End: 2023-12-14
Payer: MEDICAID

## 2023-12-14 DIAGNOSIS — Z12.39 ENCOUNTER FOR OTHER SCREENING FOR MALIGNANT NEOPLASM OF BREAST: ICD-10-CM

## 2023-12-14 PROCEDURE — C8908: CPT

## 2023-12-14 PROCEDURE — C8937: CPT

## 2023-12-14 PROCEDURE — A9585: CPT

## 2023-12-14 PROCEDURE — 77049 MRI BREAST C-+ W/CAD BI: CPT | Mod: 26

## 2023-12-27 NOTE — ED ADULT NURSE NOTE - NS ED NURSE IV DC DT
[facility]  [address] [place] [address]. [address], [address]  Phone: [phone]  Fax: [phone]    Name:Rachana Andres  :1954   Date:2023       PATIENT IS ABLE TO RETURN TO WORK AS OF:2024    [_] SEDENTARY WORK: Lifting 10 pounds maximum and occasionally lifting and/or carrying articles such as dockers, ledgers and small tools.  Although a sedentary job is defined as one which involved sitting, a certain amount of walking and standing are required only occasionally and other sedentary criteria are met.    [_] LIGHT WORK: Lifting 20 pounds with frequent lifting and/or carrying objects weighing up to 10 pounds.  Even though the weight lifted may be only a negotiable amount, a job is in the category when it involves sitting most of the time with a degree of pushing/pulling of arm and/or leg controls.    [_] MEDIUM WORK: Lifting of 50 pounds maximum with frequent lifting and/or carrying of objects up to 25 pounds.    [_] HEAVY WORK: Lifting of 100 pounds maximum with frequent lifting and/or carrying objects up to 50 pounds.    [_] VERY HEAVY WORK: Lifting objects in excess of 100 pounds with frequent lifting and/or carrying of objects weighing 50 pounds or more.    [XX] REGULAR DUTY: [XX] No Restrictions. [_] With Restrictions (See comments below0:    Sincerely,     JENNIFER Munroe MD      08-Aug-2023 21:58

## 2024-06-15 ENCOUNTER — APPOINTMENT (OUTPATIENT)
Dept: ULTRASOUND IMAGING | Facility: CLINIC | Age: 44
End: 2024-06-15
Payer: COMMERCIAL

## 2024-06-15 ENCOUNTER — RESULT REVIEW (OUTPATIENT)
Age: 44
End: 2024-06-15

## 2024-06-15 ENCOUNTER — OUTPATIENT (OUTPATIENT)
Dept: OUTPATIENT SERVICES | Facility: HOSPITAL | Age: 44
LOS: 1 days | End: 2024-06-15
Payer: COMMERCIAL

## 2024-06-15 DIAGNOSIS — Z00.8 ENCOUNTER FOR OTHER GENERAL EXAMINATION: ICD-10-CM

## 2024-06-15 PROCEDURE — 76830 TRANSVAGINAL US NON-OB: CPT | Mod: 26

## 2024-06-15 PROCEDURE — 76856 US EXAM PELVIC COMPLETE: CPT

## 2024-06-15 PROCEDURE — 76856 US EXAM PELVIC COMPLETE: CPT | Mod: 26

## 2024-06-15 PROCEDURE — 76830 TRANSVAGINAL US NON-OB: CPT

## 2024-07-24 ENCOUNTER — RESULT REVIEW (OUTPATIENT)
Age: 44
End: 2024-07-24

## 2024-08-01 ENCOUNTER — RESULT REVIEW (OUTPATIENT)
Age: 44
End: 2024-08-01

## 2024-08-01 ENCOUNTER — OUTPATIENT (OUTPATIENT)
Dept: OUTPATIENT SERVICES | Facility: HOSPITAL | Age: 44
LOS: 1 days | End: 2024-08-01
Payer: COMMERCIAL

## 2024-08-01 ENCOUNTER — APPOINTMENT (OUTPATIENT)
Dept: ULTRASOUND IMAGING | Facility: CLINIC | Age: 44
End: 2024-08-01

## 2024-08-01 DIAGNOSIS — Z00.00 ENCOUNTER FOR GENERAL ADULT MEDICAL EXAMINATION WITHOUT ABNORMAL FINDINGS: ICD-10-CM

## 2024-08-01 PROCEDURE — 76830 TRANSVAGINAL US NON-OB: CPT | Mod: 26

## 2024-08-01 PROCEDURE — 76856 US EXAM PELVIC COMPLETE: CPT | Mod: 26

## 2024-08-01 PROCEDURE — 76856 US EXAM PELVIC COMPLETE: CPT

## 2024-08-01 PROCEDURE — 76830 TRANSVAGINAL US NON-OB: CPT

## 2024-08-05 PROBLEM — Z12.11 SCREEN FOR COLON CANCER: Status: ACTIVE | Noted: 2024-08-05

## 2024-09-13 ENCOUNTER — RESULT REVIEW (OUTPATIENT)
Age: 44
End: 2024-09-13

## 2024-09-13 ENCOUNTER — APPOINTMENT (OUTPATIENT)
Dept: MRI IMAGING | Facility: CLINIC | Age: 44
End: 2024-09-13

## 2024-09-13 ENCOUNTER — OUTPATIENT (OUTPATIENT)
Dept: OUTPATIENT SERVICES | Facility: HOSPITAL | Age: 44
LOS: 1 days | End: 2024-09-13
Payer: COMMERCIAL

## 2024-09-13 DIAGNOSIS — Z00.8 ENCOUNTER FOR OTHER GENERAL EXAMINATION: ICD-10-CM

## 2024-09-13 PROCEDURE — A9585: CPT

## 2024-09-13 PROCEDURE — C8908: CPT

## 2024-09-13 PROCEDURE — C8937: CPT

## 2024-09-13 PROCEDURE — 77049 MRI BREAST C-+ W/CAD BI: CPT | Mod: 26
